# Patient Record
(demographics unavailable — no encounter records)

---

## 2025-02-03 NOTE — ASSESSMENT
[FreeTextEntry1] :  Imaging was reviewed and independently interpreted mri [facility] shoulder 1/24/25 - sup labral tearing, slight post labral tearing, 3mm post-sup paralabral cyst, RTC tendinopahty and bicep tendonits, ac deg     - The patient was advised of the diagnosis.  The natural history of the pathology was explained to the patient in layman's terms.  Several different treatment options were discussed and explained including the risks and benefits of both surgical and non-surgical treatments. Surgical risks include but are not limited to pain, infection, bleeding, vascular injury, numbness, tingling, nerve damage. - discussed r/b/a R shoulder SLAP repair, post labral repair, cyst excision at length - For now, due to the risk of surgery we will continue conservative treatment with PT, icing, and anti-inflammatory medication. - The patient was provided with a prescription for Physical Therapy. - The patient is to continue home exercises learned at physical therapy. - The patient was advised to let pain guide the gradual advancement of activities. - fu 4-6 week if cont symptoms he will consider surgery

## 2025-02-03 NOTE — HISTORY OF PRESENT ILLNESS
[de-identified] : 25 year old male  (RHD , gov worker, deskwork , works out)  chronic right shoulder pain since 2024 worsening since 1/4/25 when working out and re-aggrvated The pain is located anterior, lateral    The pain is associated with clicking, popping Worse with activity and better at rest. Has tried ice, stretching , [place] [facility] [place] [facility]  2/3/25 - cont pain along with clicking and popping, send diclofenac, had mri, switiching PT to profesisonal PT

## 2025-02-03 NOTE — IMAGING
[de-identified] :    RIGHT SHOULDER Inspection: No swelling.  Palpation: Tenderness is noted at the bicipital groove, anterior and lateral.  Range of motion: There is pain with range of motion. , ER 55, @90ER 90, @90IR 30 Strength: There is pain and discomfort with strength testing. Forward Flexion 5-/5. Abduction 5-/5.  External Rotation 5-/5 and Internal Rotation 5-/5  Neurological testings: motor and sensor intact distally. Ligament Stability and Special Tests:  There is positive arc of pain.  Shoulder apprehension: pos Shoulder relocation: pos Obriens test: pos Biceps Active test: pos Rowe Labral Shear: pos Impingement testing: pos Soumya testing: pos Whipple: pos Cross Body Adduction: neg

## 2025-02-03 NOTE — DISCUSSION/SUMMARY
[de-identified] : The patient was advised of the diagnosis. The natural history of the pathology was explained in full to the patient in layman's terms. Several different treatment options were discussed and explained to the patient including the risks and benefits of both surgical and non-surgical treatments.   I do think they are a candidate for surgery, given their instability , pain, and failure to improve with previous non-operative treatment in order to stabilize their shoulder, decrease their risk of another dislocation event, and prevent further chondral damage and the long term development of arthritis. We discussed that the goal of surgery is pain relief along with improved function, stability and motion.  The risks, benefits, and alternatives to surgical arthroscopy right shoulder with capsulorrhaphy, labral repair, and cyst excision were reviewed with the patient. Risks of surgery were outlined in full to the patient including but not limited to pain, infection (superficial or deep), bleeding, vascular injury, numbness, tingling, nerve damage (direct or indirect), scarring, stiffness, non-healing, wound breakdown, failure to resolve symptoms, symptom recurrence, the need for further surgery, as well as medical complications such as blood clots, pulmonary embolism, heart attack, stroke, and other anesthesia complications including even death. The patient understood and accepted these risks and that other complications not mentioned above could occur.  They understands that 100% recovery is not assured and the desired level of function may not be achievable. We discussed the potential for prolonged recovery course and the potential for this to affect their activities, which could include sports/work regimen.  The intraoperative plan, post-operative plan, post-operative expectations and limitations were explained in full. The importance of postoperative rehabilitation and restriction compliance was emphasized. Expectations from non-surgical treatment were explained in full as well. The patient demonstrated a complete understanding of the treatment alternatives.

## 2025-04-01 NOTE — DISCUSSION/SUMMARY
[de-identified] : The patient was advised of the diagnosis. The natural history of the pathology was explained in full to the patient in layman's terms. Several different treatment options were discussed and explained to the patient including the risks and benefits of both surgical and non-surgical treatments.   I do think they are a candidate for surgery, given their instability , pain, and failure to improve with previous non-operative treatment in order to stabilize their shoulder, decrease their risk of another dislocation event, and prevent further chondral damage and the long term development of arthritis. We discussed that the goal of surgery is pain relief along with improved function, stability and motion.  The risks, benefits, and alternatives to surgical arthroscopy right shoulder with capsulorrhaphy, labral repair, and cyst excision were reviewed with the patient. Risks of surgery were outlined in full to the patient including but not limited to pain, infection (superficial or deep), bleeding, vascular injury, numbness, tingling, nerve damage (direct or indirect), scarring, stiffness, non-healing, wound breakdown, failure to resolve symptoms, symptom recurrence, the need for further surgery, as well as medical complications such as blood clots, pulmonary embolism, heart attack, stroke, and other anesthesia complications including even death. The patient understood and accepted these risks and that other complications not mentioned above could occur.  They understands that 100% recovery is not assured and the desired level of function may not be achievable. We discussed the potential for prolonged recovery course and the potential for this to affect their activities, which could include sports/work regimen.  The intraoperative plan, post-operative plan, post-operative expectations and limitations were explained in full. The importance of postoperative rehabilitation and restriction compliance was emphasized. Expectations from non-surgical treatment were explained in full as well. The patient demonstrated a complete understanding of the treatment alternatives.

## 2025-04-01 NOTE — ASSESSMENT
[FreeTextEntry1] :  mri [facility] shoulder 1/24/25 - sup labral tearing, slight post labral tearing, 3mm post-sup paralabral cyst, RTC tendinopahty and bicep tendonits, ac deg   - The patient was advised of the diagnosis.  The natural history of the pathology was explained to the patient in layman's terms.  Several different treatment options were discussed and explained including the risks and benefits of both surgical and non-surgical treatments. Surgical risks include but are not limited to pain, infection, bleeding, vascular injury, numbness, tingling, nerve damage. - Given their  failure to improve with previous non-operative treatment, they are a candidate for surgery in order to stabilize their shoulder, decrease their risk of another dislocation event, and prevent further chondral damage and the long term development of arthritis. - The pros and cons along with the risks, benefits, and alternatives to  right shoulder surgical arthroscopy with  SLAP repair, post labral repair, cyst excision were discussed  at length

## 2025-04-01 NOTE — IMAGING
[de-identified] :    RIGHT SHOULDER Inspection: No swelling.  Palpation: Tenderness is noted at the bicipital groove, anterior and lateral.  Range of motion: There is pain with range of motion. , ER 55, @90ER 90, @90IR 30 Strength: There is pain and discomfort with strength testing. Forward Flexion 5-/5. Abduction 5-/5.  External Rotation 5-/5 and Internal Rotation 5-/5  Neurological testings: motor and sensor intact distally. Ligament Stability and Special Tests:  There is positive arc of pain.  Shoulder apprehension: pos Shoulder relocation: pos Obriens test: pos Biceps Active test: pos Rowe Labral Shear: pos Impingement testing: pos Soumya testing: pos Whipple: pos Cross Body Adduction: neg

## 2025-04-01 NOTE — HISTORY OF PRESENT ILLNESS
[de-identified] : 25 year old male  (RHD , gov worker, deskwork , works out)  chronic right shoulder pain since 2024 worsening since 1/4/25 when working out and re-aggrvated The pain is located anterior, lateral    The pain is associated with clicking, popping Worse with activity and better at rest. [place] tried ice, stretching , [place] [facility] [place]  2/3/25 - cont pain along with clicking and popping, send diclofenac, had mri, switiching PT to profesisonal PT 4/1/25-  R shoulder pain persists ,  PT Prof ([place]) [facility], cont pain and clikcing and popping and sense of instability

## 2025-06-09 NOTE — HISTORY OF PRESENT ILLNESS
[de-identified] : 25 year old male  (RHD , gov worker, deskwork , works out)  chronic right shoulder pain since 2024 worsening since 1/4/25 when working out and re-aggrvated The pain is located anterior, lateral    The pain is associated with clicking, popping Worse with activity and better at rest. Has tried ice, stretching , PT in [facility] [place], [place]  2/3/25 - cont pain along with clicking and popping, send diclofenac, had mri, switiching PT to profesisonal PT 4/1/25-  R shoulder pain persists ,  PT Prof ([place]) HEP, cont pain and clikcing and popping and sense of instability  **s/p R shoulder SLAP repair, post labral repair, cyst exc on 5/30/25**  6/5/25 - po visit, using sling, PT at Prof in [place]

## 2025-06-09 NOTE — HISTORY OF PRESENT ILLNESS
[de-identified] : 25 year old male  (RHD , gov worker, deskwork , works out)  chronic right shoulder pain since 2024 worsening since 1/4/25 when working out and re-aggrvated The pain is located anterior, lateral    The pain is associated with clicking, popping Worse with activity and better at rest. Has tried ice, stretching , PT in [facility] [place], [place]  2/3/25 - cont pain along with clicking and popping, send diclofenac, had mri, switiching PT to profesisonal PT 4/1/25-  R shoulder pain persists ,  PT Prof ([place]) HEP, cont pain and clikcing and popping and sense of instability  **s/p R shoulder SLAP repair, post labral repair, cyst exc on 5/30/25**  6/5/25 - po visit, using sling, PT at Prof in [place]

## 2025-06-09 NOTE — IMAGING
[de-identified] :  RIGHT  SHOULDER Inspection: incisions c/d/i Palpation: No Tenderness is noted  Range of motion:  in sling Strength:   Neurological testing: motor and sensor intact distally. Ligament Stability and Special Tests:  Shoulder apprehension:  Shoulder relocation:  Obriens test: Biceps Active test: Rowe Labral Shear:  Impingement testing:  Soumya testing: Cross Body Adduction:

## 2025-06-09 NOTE — ASSESSMENT
[FreeTextEntry1] : s/p R shoulder SLAP repair, post labral repair, cyst exc on 5/30/25  - PT on post op protocol - The patient was provided with a prescription for Physical Therapy  - Home exercises program learned at physical therapy. - The patient was advised to apply ice (wrapped in a towel or protective covering) to the area daily (20 minutes at a time, 2-4X/day). - fu 8 week re-eval

## 2025-06-09 NOTE — IMAGING
[de-identified] :  RIGHT  SHOULDER Inspection: incisions c/d/i Palpation: No Tenderness is noted  Range of motion:  in sling Strength:   Neurological testing: motor and sensor intact distally. Ligament Stability and Special Tests:  Shoulder apprehension:  Shoulder relocation:  Obriens test: Biceps Active test: Rowe Labral Shear:  Impingement testing:  Soumya testing: Cross Body Adduction: